# Patient Record
Sex: MALE | Race: BLACK OR AFRICAN AMERICAN | URBAN - METROPOLITAN AREA
[De-identification: names, ages, dates, MRNs, and addresses within clinical notes are randomized per-mention and may not be internally consistent; named-entity substitution may affect disease eponyms.]

---

## 2021-04-06 ENCOUNTER — OFFICE VISIT (OUTPATIENT)
Dept: URBAN - METROPOLITAN AREA CLINIC 80 | Facility: CLINIC | Age: 51
End: 2021-04-06

## 2021-04-29 ENCOUNTER — OFFICE VISIT (OUTPATIENT)
Dept: URBAN - METROPOLITAN AREA CLINIC 80 | Facility: CLINIC | Age: 51
End: 2021-04-29

## 2021-05-10 ENCOUNTER — LAB OUTSIDE AN ENCOUNTER (OUTPATIENT)
Dept: URBAN - METROPOLITAN AREA CLINIC 126 | Facility: CLINIC | Age: 51
End: 2021-05-10

## 2021-05-10 ENCOUNTER — WEB ENCOUNTER (OUTPATIENT)
Dept: URBAN - METROPOLITAN AREA CLINIC 126 | Facility: CLINIC | Age: 51
End: 2021-05-10

## 2021-05-10 ENCOUNTER — OFFICE VISIT (OUTPATIENT)
Dept: URBAN - METROPOLITAN AREA CLINIC 126 | Facility: CLINIC | Age: 51
End: 2021-05-10
Payer: COMMERCIAL

## 2021-05-10 DIAGNOSIS — Z12.11 SCREENING FOR COLON CANCER: ICD-10-CM

## 2021-05-10 PROCEDURE — 99203 OFFICE O/P NEW LOW 30 MIN: CPT | Performed by: INTERNAL MEDICINE

## 2021-05-10 RX ORDER — POLYETHYLENE GLYCOL 3350, SODIUM SULFATE, SODIUM CHLORIDE, POTASSIUM CHLORIDE, ASCORBIC ACID, SODIUM ASCORBATE 140-9-5.2G
AS DIRECTED KIT ORAL
Qty: 1 DOSE PACK | Refills: 0 | OUTPATIENT
Start: 2021-05-10 | End: 2021-05-11

## 2021-05-10 NOTE — HPI-TODAY'S VISIT:
healthy gentleman  has irreg bowels  some diarrhea one day none the next   is lactose intolerant  no bleeding  wt stable    no family history

## 2021-07-14 ENCOUNTER — OFFICE VISIT (OUTPATIENT)
Dept: URBAN - METROPOLITAN AREA SURGERY CENTER 19 | Facility: SURGERY CENTER | Age: 51
End: 2021-07-14
Payer: COMMERCIAL

## 2021-07-14 ENCOUNTER — CLAIMS CREATED FROM THE CLAIM WINDOW (OUTPATIENT)
Dept: URBAN - METROPOLITAN AREA CLINIC 4 | Facility: CLINIC | Age: 51
End: 2021-07-14
Payer: COMMERCIAL

## 2021-07-14 DIAGNOSIS — K63.89 POLYP OF ILEUM: ICD-10-CM

## 2021-07-14 DIAGNOSIS — Z12.11 COLON CANCER SCREENING: ICD-10-CM

## 2021-07-14 DIAGNOSIS — K63.5 BENIGN COLON POLYP: ICD-10-CM

## 2021-07-14 PROCEDURE — 88305 TISSUE EXAM BY PATHOLOGIST: CPT | Performed by: PATHOLOGY

## 2021-07-14 PROCEDURE — G8907 PT DOC NO EVENTS ON DISCHARG: HCPCS | Performed by: INTERNAL MEDICINE

## 2021-07-14 PROCEDURE — 45380 COLONOSCOPY AND BIOPSY: CPT | Performed by: INTERNAL MEDICINE

## 2021-11-14 NOTE — PHYSICAL EXAM HENT:
Head, normocephalic, atraumatic, Face, Face within normal limits , Head, normocephalic, atraumatic, Face, Face within normal limits, Ears, External ears within normal limits, Nose/Nasopharynx, External nose  normal appearance, nares patent, no nasal discharge, Mouth and Throat, Oral cavity appearance normal, Breath odor normal, Lips, Appearance normal [General Appearance - Well Developed] : well developed [Normal Appearance] : normal appearance [Well Groomed] : well groomed [No Deformities] : no deformities [General Appearance - Well Nourished] : well nourished [General Appearance - In No Acute Distress] : no acute distress [Normal Conjunctiva] : the conjunctiva exhibited no abnormalities [Eyelids - No Xanthelasma] : the eyelids demonstrated no xanthelasmas [Normal Oral Mucosa] : normal oral mucosa [No Oral Pallor] : no oral pallor [No Oral Cyanosis] : no oral cyanosis [Normal Jugular Venous A Waves Present] : normal jugular venous A waves present [Normal Jugular Venous V Waves Present] : normal jugular venous V waves present [No Jugular Venous Steven A Waves] : no jugular venous steven A waves [Normal Rate] : normal [Rhythm Regular] : regular [Normal S1] : normal S1 [Normal S2] : normal S2 [II] : a grade 2 [Right Carotid Bruit] : right carotid bruit heard [Left Carotid Bruit] : left carotid bruit heard [Respiration, Rhythm And Depth] : normal respiratory rhythm and effort [Exaggerated Use Of Accessory Muscles For Inspiration] : no accessory muscle use [Auscultation Breath Sounds / Voice Sounds] : lungs were clear to auscultation bilaterally [Abdomen Soft] : soft [Abdomen Tenderness] : non-tender [] : no hepato-splenomegaly [Abdomen Mass (___ Cm)] : no abdominal mass palpated [Abnormal Walk] : normal gait [Gait - Sufficient For Exercise Testing] : the gait was sufficient for exercise testing [Click] : no click

## 2022-02-08 ENCOUNTER — OFFICE VISIT (OUTPATIENT)
Dept: URBAN - METROPOLITAN AREA CLINIC 80 | Facility: CLINIC | Age: 52
End: 2022-02-08
Payer: COMMERCIAL

## 2022-02-08 ENCOUNTER — WEB ENCOUNTER (OUTPATIENT)
Dept: URBAN - METROPOLITAN AREA CLINIC 80 | Facility: CLINIC | Age: 52
End: 2022-02-08

## 2022-02-08 DIAGNOSIS — Z12.11 SCREENING FOR COLON CANCER: ICD-10-CM

## 2022-02-08 DIAGNOSIS — R74.8 ELEVATED LIVER ENZYMES: ICD-10-CM

## 2022-02-08 DIAGNOSIS — K76.0 FATTY LIVER: ICD-10-CM

## 2022-02-08 PROBLEM — 305058001: Status: ACTIVE | Noted: 2021-05-11

## 2022-02-08 PROBLEM — 197321007: Status: ACTIVE | Noted: 2022-02-08

## 2022-02-08 PROCEDURE — 99214 OFFICE O/P EST MOD 30 MIN: CPT | Performed by: INTERNAL MEDICINE

## 2022-02-08 NOTE — PHYSICAL EXAM NECK/THYROID:
6/8/2017    Lj Manriquez   [facility] [id number]           Return to work    This is to certify that Lj Manriquez was seen in the clinic today 6/8/2017 and  is unable to return to work until reevaluation in 2 weeks.       Thank you,      _______________________________________________________   Elle Culp PA-C        [facility]  Orthopedic Department  [address] [place] [address].  [place]  [address]  Phone: [phone]  Fax: [phone]       normal appearance , without tenderness upon palpation , no deformities , trachea midline , Thyroid normal size , no thyroid nodules , no masses , no JVD , thyroid nontender

## 2022-02-08 NOTE — HPI-TODAY'S VISIT:
Colonoscopy screening in 2021 hyperplastic polyp repeat recommended in 5 years  Not feeling well after thanksgiving COVID negative and labs with el liver numbers That same day went to ER with more labs and liver enzymes improving  Was feeling miserable for a while but now is better Regular EtOH but not every day, less than 10 drinks a week

## 2022-02-14 LAB
A/G RATIO: 1.6
ACTIN (SMOOTH MUSCLE) ANTIBODY: 5
ALBUMIN: 4.6
ALKALINE PHOSPHATASE: 267
ALT (SGPT): 73
ANTI-NUCLEAR AB BY IFA (RDL): POSITIVE
AST (SGOT): 78
BILIRUBIN, TOTAL: 0.3
BUN/CREATININE RATIO: 12
BUN: 17
CALCIUM: 10
CARBON DIOXIDE, TOTAL: 24
CENTRIOLE PATTERN: (no result)
CENTROMERE PATTERN: (no result)
CHLORIDE: 101
CREATININE: 1.44
EGFR IF AFRICN AM: 65
EGFR IF NONAFRICN AM: 56
FERRITIN, SERUM: 259
GLOBULIN, TOTAL: 2.8
GLUCOSE: 181
HCV GENOTYPE: (no result)
HCV LOG10: (no result)
HEPATITIS C QUANTITATION: (no result)
HOMOGENEOUS PATTERN: (no result)
IGG, IMMUNOGLOBULIN G (RDL): 1444
INR: 0.9
IRON BIND.CAP.(TIBC): 292
IRON SATURATION: 29
IRON: 86
LIVER-KIDNEY MICROSOMAL AB: 1.2
Lab: (no result)
MIDBODY PATTERN: (no result)
MITOCHONDRIAL (M2) ANTIBODY: <20
NUCLEAR DOT PATTERN: (no result)
NUCLEAR MEMBRANE PATTERN: (no result)
NUCLEOLAR PATTERN: (no result)
PCNA PATTERN: (no result)
POTASSIUM: 4.5
PROTEIN, TOTAL: 7.4
PROTHROMBIN TIME: 9.8
SODIUM: 140
SPECKLED PATTERN: (no result)
SPINDLE APPARATUS PATTERN: (no result)
TEST INFORMATION:: (no result)
UIBC: 206

## 2022-03-01 ENCOUNTER — DASHBOARD ENCOUNTERS (OUTPATIENT)
Age: 52
End: 2022-03-01

## 2022-03-01 ENCOUNTER — OFFICE VISIT (OUTPATIENT)
Dept: URBAN - METROPOLITAN AREA TELEHEALTH 2 | Facility: TELEHEALTH | Age: 52
End: 2022-03-01
Payer: COMMERCIAL

## 2022-03-01 DIAGNOSIS — Z12.11 SCREENING FOR COLON CANCER: ICD-10-CM

## 2022-03-01 DIAGNOSIS — K76.0 FATTY LIVER: ICD-10-CM

## 2022-03-01 DIAGNOSIS — R74.8 ELEVATED LIVER ENZYMES: ICD-10-CM

## 2022-03-01 PROCEDURE — 99213 OFFICE O/P EST LOW 20 MIN: CPT | Performed by: INTERNAL MEDICINE

## 2022-04-29 ENCOUNTER — LAB OUTSIDE AN ENCOUNTER (OUTPATIENT)
Dept: URBAN - METROPOLITAN AREA CLINIC 80 | Facility: CLINIC | Age: 52
End: 2022-04-29

## 2022-04-30 LAB
A/G RATIO: 1.6
ALBUMIN: 4.7
ALKALINE PHOSPHATASE: 160
ALT (SGPT): 48
AST (SGOT): 44
BILIRUBIN, TOTAL: 0.3
BUN/CREATININE RATIO: 10
BUN: 15
CALCIUM: 9.9
CARBON DIOXIDE, TOTAL: 24
CHLORIDE: 101
CREATININE: 1.46
EGFR: 58
GLOBULIN, TOTAL: 3
GLUCOSE: 106
POTASSIUM: 4.1
PROTEIN, TOTAL: 7.7
SODIUM: 141

## 2024-01-04 NOTE — PHYSICAL EXAM CONSTITUTIONAL:
Patient scheduled with Dr. Costa 1/4/24.       normal, alert, in no acute distress, well developed, well nourished, ambulating without difficulty, normal communication ability , well developed, well nourished , in no acute distress , ambulating without difficulty , normal communication ability